# Patient Record
Sex: FEMALE | Race: WHITE | HISPANIC OR LATINO | Employment: OTHER | URBAN - METROPOLITAN AREA
[De-identification: names, ages, dates, MRNs, and addresses within clinical notes are randomized per-mention and may not be internally consistent; named-entity substitution may affect disease eponyms.]

---

## 2017-11-28 ENCOUNTER — OFFICE VISIT (OUTPATIENT)
Dept: BARIATRICS/WEIGHT MGMT | Facility: CLINIC | Age: 57
End: 2017-11-28

## 2017-11-28 VITALS
SYSTOLIC BLOOD PRESSURE: 172 MMHG | WEIGHT: 222 LBS | RESPIRATION RATE: 16 BRPM | BODY MASS INDEX: 39.34 KG/M2 | HEIGHT: 63 IN | HEART RATE: 71 BPM | DIASTOLIC BLOOD PRESSURE: 85 MMHG | TEMPERATURE: 98.2 F

## 2017-11-28 DIAGNOSIS — R63.2 POLYPHAGIA: ICD-10-CM

## 2017-11-28 DIAGNOSIS — I10 ESSENTIAL HYPERTENSION: ICD-10-CM

## 2017-11-28 DIAGNOSIS — E66.9 OBESITY, CLASS II, BMI 35-39.9: Primary | ICD-10-CM

## 2017-11-28 DIAGNOSIS — E11.69 DIABETES MELLITUS TYPE 2 IN OBESE (HCC): ICD-10-CM

## 2017-11-28 DIAGNOSIS — E66.9 DIABETES MELLITUS TYPE 2 IN OBESE (HCC): ICD-10-CM

## 2017-11-28 PROBLEM — Z46.51 FITTING AND ADJUSTMENT OF GASTRIC LAP BAND: Status: ACTIVE | Noted: 2017-11-28

## 2017-11-28 PROCEDURE — S2083 ADJUSTMENT GASTRIC BAND: HCPCS | Performed by: NURSE PRACTITIONER

## 2017-11-28 RX ORDER — PIOGLITAZONEHYDROCHLORIDE 30 MG/1
1 TABLET ORAL DAILY
COMMUNITY
Start: 2017-11-24

## 2017-11-28 RX ORDER — AMLODIPINE BESYLATE 5 MG/1
TABLET ORAL
COMMUNITY
Start: 2017-11-24

## 2017-11-28 RX ORDER — NATEGLINIDE 60 MG/1
1 TABLET ORAL 2 TIMES DAILY
COMMUNITY
Start: 2017-11-24

## 2017-11-28 RX ORDER — OXYBUTYNIN CHLORIDE 5 MG/1
1 TABLET ORAL DAILY
COMMUNITY
Start: 2017-11-24

## 2017-11-28 RX ORDER — LOSARTAN POTASSIUM 50 MG/1
TABLET ORAL
COMMUNITY
Start: 2017-09-29 | End: 2021-12-07

## 2017-11-28 NOTE — PROGRESS NOTES
[facility] BARIATRIC [facility] BARIATRIC SURGERY  [address]  [facility] [address] [phone]  [address]  [facility] [address] [phone]  Dept: [phone]  11/28/2017      Meera Ewing.  [id number]  [id number]  1960  female      Chief Complaint   Patient presents with   • Follow-up     Followup AGB       BH Post-Op Bariatric Surgery:   Meera Ewing is status post Lapband procedure (APL), performed in 2014 in [place].     HPI:   Today's weight is 222 lb (101 kg)  pounds, today's BMI is Body mass index is 39.33 kg/(m^2).  The patient reports a portion size larger than the small plate, increased hunger and denies a loss of appetite.     Meera Ewing denies nausea, dysphagia, or abdominal pain. The patientdoes not have vomitng. The patientdoes not have reflux.    Diet and Exercise: Diet history reviewed and discussed with the patient. Weight loss/gains to date discussed with the patient. She reports eating 3 meals per day, a typical portion size of greater than 1 cup, eating 1 snack per day, drinking 2-3, 8-oz. glasses of water per day. The patient can tolerate solid protein.   The patient is eating protein first. The patient is limiting food volume. The patient is not taking vitamins. The patient is limiting snacking. The patient is exercising regularly. She is not drinking carbonated beverages.     The following portions of the patient's history were reviewed and updated as appropriate: allergies, current medications, past family history, past medical history, past social history, past surgical history and problem list.    Vitals:    11/28/17 0910   BP: 172/85   Pulse: 71   Resp: 16   Temp: 98.2 °F (36.8 °C)       Review of Systems   Constitutional: Positive for appetite change (polypaghia). Negative for fatigue and unexpected weight change.   HENT: Negative.    Eyes: Negative.    Respiratory: Negative.    Cardiovascular: Negative.  Negative  for leg swelling.   Gastrointestinal: Negative for abdominal distention, abdominal pain, constipation, diarrhea, nausea and vomiting.   Genitourinary: Negative for difficulty urinating, frequency and urgency.   Musculoskeletal: Negative for back pain.   Skin: Negative.    Psychiatric/Behavioral: Negative.    All other systems reviewed and are negative.      Physical Exam   Constitutional: She appears well-developed and well-nourished.   Neck: No thyromegaly present.   Cardiovascular: Normal rate, regular rhythm and normal heart sounds.    Pulmonary/Chest: Effort normal and breath sounds normal. No respiratory distress. She has no wheezes.   Abdominal: Soft. Bowel sounds are normal. She exhibits no distension. There is no tenderness. There is no guarding. No hernia.   Musculoskeletal: She exhibits no edema or tenderness.   Neurological: She is alert.   Skin: Skin is warm and dry. No rash noted. No erythema.   Psychiatric: She has a normal mood and affect. Her behavior is normal.   Nursing note and vitals reviewed.        Assessment: Post-operatively the patient is doing well    Encounter Diagnosis   Name Primary?   • Obesity, Class II, BMI 35-39.9 Yes       Plan:     I think she would benefit from additional band restriction.  Under aseptic conditions, I accessed the port and 0.4mls of fluid were added for a total of 8.6mls.  She was able to tolerate a glass of water at the conclusion of the fill.  She was advised to consume soft solids for 24 hours before advancing back to a regular diet.  RTC for n/v/d/regurg.     We discussed her protein sources and that eating dense solid protein along with plenty of vegetables and fresh fruits is important for weight loss. Reviewed appropriate water intake- half of body weight in ounces and exercise routine- minimum of 150 minutes per with including both cardio and strength training. Instructed not to drink with meals and wait 45 minutes after each meal before drinking.    She  should follow-up in 4-6 weeks       Activity restrictions: None.   Instructions / Recommendations: dietary counseling recommended, recommended a daily protein intake of  grams, patient was advised that the lap band system works best when consuming solid foods, vitamin supplement(s) recommended, recommended exercising at least 150 minutes per week, behavior modifications recommended and instructed to call the office for concerns, questions, or problems.

## 2017-12-28 ENCOUNTER — OFFICE VISIT (OUTPATIENT)
Dept: BARIATRICS/WEIGHT MGMT | Facility: CLINIC | Age: 57
End: 2017-12-28

## 2017-12-28 VITALS
HEART RATE: 57 BPM | HEIGHT: 63 IN | TEMPERATURE: 98.6 F | BODY MASS INDEX: 38.27 KG/M2 | DIASTOLIC BLOOD PRESSURE: 80 MMHG | RESPIRATION RATE: 16 BRPM | SYSTOLIC BLOOD PRESSURE: 167 MMHG | WEIGHT: 216 LBS

## 2017-12-28 DIAGNOSIS — E66.9 OBESITY, CLASS II, BMI 35-39.9: Primary | ICD-10-CM

## 2017-12-28 DIAGNOSIS — E66.9 DIABETES MELLITUS TYPE 2 IN OBESE (HCC): ICD-10-CM

## 2017-12-28 DIAGNOSIS — I10 ESSENTIAL HYPERTENSION: ICD-10-CM

## 2017-12-28 DIAGNOSIS — R63.2 POLYPHAGIA: ICD-10-CM

## 2017-12-28 DIAGNOSIS — E11.69 DIABETES MELLITUS TYPE 2 IN OBESE (HCC): ICD-10-CM

## 2017-12-28 PROCEDURE — 99213 OFFICE O/P EST LOW 20 MIN: CPT | Performed by: NURSE PRACTITIONER

## 2017-12-28 NOTE — PROGRESS NOTES
[facility] BARIATRIC [facility] BARIATRIC SURGERY  [address]  [facility] [id number] [phone]  [address]  [facility] [address] [phone]  Dept: [phone]  12/28/2017      Meera Ewing.  [id number]  [id number]  1960  female      Chief Complaint   Patient presents with   • Follow-up     Followup AGB (8.6 mL)       BH Post-Op Bariatric Surgery:   Meera Ewing is status post Lapband procedure (APL), performed in 2014     HPI:   Today's weight is 98 kg (216 lb) pounds, today's BMI is Body mass index is 38.27 kg/(m^2). and she has a loss of 6 pounds since the last visit. The patient reports experiencing hunger, but decreased hunger and loss of appetite.     Meera Ewing denies nausea, dysphagia or abdominal pain. The patientdoes not have vomiting or regurgitation. The patientdoes not have heartburn/reflux.    Patient states their portion size is the small plate and their hunger is appropriate.    Diet and Exercise: Diet history reviewed and discussed with the patient. Weight loss/gains to date discussed with the patient. She reports eating 3 meals per day, a typical portion size of 3 cup, eating 1 snack per day, drinking 3-4 8-oz. glasses of water per day. The patient can tolerate solid protein.   The patient is eating protein first. The patient is limiting food volume. The patient is taking vitamins. The patient is limiting snacking. The patient is regular exercise- walking 3-4 days per week. She is drinking carbonated beverages.     The following portions of the patient's history were reviewed and updated as appropriate: allergies, current medications, past family history, past medical history, past social history, past surgical history and problem list.    Review of Systems   Constitutional: Positive for appetite change. Negative for fatigue and unexpected weight change.   HENT: Negative.    Eyes: Negative.    Respiratory: Negative.     Cardiovascular: Negative.  Negative for leg swelling.   Gastrointestinal: Negative for abdominal distention, abdominal pain, constipation, diarrhea, nausea and vomiting.   Genitourinary: Negative for difficulty urinating, frequency and urgency.   Musculoskeletal: Negative for back pain.   Skin: Negative.    Psychiatric/Behavioral: Negative.    All other systems reviewed and are negative.      Vitals:    12/28/17 0839   BP: 167/80   Pulse: 57   Resp: 16   Temp: 98.6 °F (37 °C)       Physical Exam   Constitutional: She appears well-developed and well-nourished.   Neck: No thyromegaly present.   Cardiovascular: Normal rate, regular rhythm and normal heart sounds.    Pulmonary/Chest: Effort normal and breath sounds normal. No respiratory distress. She has no wheezes.   Abdominal: Soft. Bowel sounds are normal. She exhibits no distension. There is no tenderness. There is no guarding. No hernia.   Musculoskeletal: She exhibits no edema or tenderness.   Neurological: She is alert.   Skin: Skin is warm and dry. No rash noted. No erythema.   Psychiatric: She has a normal mood and affect. Her behavior is normal.   Nursing note and vitals reviewed.      Assessment: Post-operatively the patient is doing well.     Encounter Diagnoses   Name Primary?   • Obesity, Class II, BMI 35-39.9 Yes   • Essential hypertension    • Diabetes mellitus type 2 in obese    • Polyphagia        Plan:    No adjustment today as patient has ideal level of restriction. RTC for n/v/d/regurg. Encouraged patient to be sure to eat plenty of dense lean protein and high fiber foods like vegetables and fresh fruits while reducing simple carbohydrates. Reviewed the importance of eating solid foods vs. soft which will contribute to weight gain. Discussed the recommended amount of water to intake daily- half of body weight in ounces. Not drinking with or right after meals.    Activity restrictions: None.   Instructions / Recommendations: dietary counseling  recommended, recommended a daily protein intake of  grams, patient was advised that the lap band system works best when consuming solid foods, vitamin supplement(s) recommended, recommended exercising at least 150 minutes per week inclduing both cardio and strength training, behavior modifications recommended and instructed to call the office for concerns, questions, or problems.     The patient was instructed to follow up in 4-6 weeks      The patient was counseled regarding exercise, fluid intake, protein intake, lab work. Total time spent face to face was 20 minutes and 15 minutes was spent counseling.

## 2021-12-07 ENCOUNTER — OFFICE VISIT (OUTPATIENT)
Dept: BARIATRICS/WEIGHT MGMT | Facility: CLINIC | Age: 61
End: 2021-12-07

## 2021-12-07 VITALS
WEIGHT: 235 LBS | RESPIRATION RATE: 18 BRPM | SYSTOLIC BLOOD PRESSURE: 136 MMHG | HEIGHT: 63 IN | BODY MASS INDEX: 41.64 KG/M2 | DIASTOLIC BLOOD PRESSURE: 76 MMHG | TEMPERATURE: 97.5 F | HEART RATE: 72 BPM

## 2021-12-07 DIAGNOSIS — I10 ESSENTIAL HYPERTENSION: ICD-10-CM

## 2021-12-07 DIAGNOSIS — E66.01 OBESITY, CLASS III, BMI 40-49.9 (MORBID OBESITY) (HCC): Primary | ICD-10-CM

## 2021-12-07 DIAGNOSIS — E66.9 DIABETES MELLITUS TYPE 2 IN OBESE (HCC): ICD-10-CM

## 2021-12-07 DIAGNOSIS — E11.69 DIABETES MELLITUS TYPE 2 IN OBESE (HCC): ICD-10-CM

## 2021-12-07 PROCEDURE — 99203 OFFICE O/P NEW LOW 30 MIN: CPT | Performed by: NURSE PRACTITIONER

## 2021-12-07 RX ORDER — FERROUS SULFATE 325(65) MG
325 TABLET ORAL DAILY
COMMUNITY

## 2021-12-07 RX ORDER — CLOTRIMAZOLE AND BETAMETHASONE DIPROPIONATE 10; .64 MG/G; MG/G
1 CREAM TOPICAL 2 TIMES DAILY PRN
COMMUNITY

## 2021-12-07 RX ORDER — UREA 10 %
800 LOTION (ML) TOPICAL DAILY
COMMUNITY

## 2021-12-07 RX ORDER — ERGOCALCIFEROL 1.25 MG/1
50000 CAPSULE ORAL WEEKLY
COMMUNITY

## 2021-12-07 RX ORDER — ATORVASTATIN CALCIUM 10 MG/1
1 TABLET, FILM COATED ORAL DAILY
COMMUNITY
Start: 2021-07-23

## 2021-12-07 RX ORDER — AMMONIUM LACTATE 12 G/100G
1 LOTION TOPICAL SEE ADMIN INSTRUCTIONS
COMMUNITY
Start: 2021-07-23 | End: 2021-12-07

## 2021-12-07 RX ORDER — LOSARTAN POTASSIUM 100 MG/1
100 TABLET ORAL DAILY
COMMUNITY
Start: 2021-10-20 | End: 2022-10-21

## 2021-12-07 RX ORDER — BACITRACIN ZINC 500 [USP'U]/G
1 OINTMENT TOPICAL 2 TIMES DAILY
COMMUNITY
Start: 2021-06-29 | End: 2021-12-07

## 2021-12-07 RX ORDER — BIOTIN 10 MG
1 TABLET ORAL DAILY
COMMUNITY

## 2021-12-07 RX ORDER — TOPIRAMATE 50 MG/1
TABLET, FILM COATED ORAL
Qty: 194 TABLET | Refills: 0 | Status: SHIPPED | OUTPATIENT
Start: 2021-12-07 | End: 2022-02-15

## 2021-12-07 RX ORDER — MELATONIN
5000 DAILY
COMMUNITY
End: 2021-12-07

## 2021-12-07 RX ORDER — ZOLPIDEM TARTRATE 10 MG/1
10 TABLET ORAL NIGHTLY
COMMUNITY
Start: 2021-11-12 | End: 2022-05-12

## 2021-12-07 NOTE — PROGRESS NOTES
[facility] BARIATRIC [facility] BARIATRIC SURGERY  [address] [facility] [address]  [facility] [address]  [phone]  [address] [address]  [phone]  Dept: [phone]  12/7/2021      Meera Ewing.  [id number]  [id number]  1960  female      Chief Complaint   Patient presents with   • Follow-up     BAND FOLLOW UP       BH Post-Op Bariatric Surgery:   Meera Ewing is status post Lapband procedure APL, performed in 2014 with 8.6mls who presents seeking adjunct therapy to aid in weight loss.    HPI:   Today's weight is 107 kg (235 lb) pounds, today's BMI is Body mass index is 41.64 kg/m². and@ has a gain of 19 pounds since the last visit. The patient reports experiencing hunger, but decreased hunger and loss of appetite.     Meera Ewing denies abdominal pain. The patientdoes not have vomiting or regurgitation. The patientdoes not have heartburn/reflux.    Patient states their portion size is the small plate and their hunger is appropriate.    Diet and Exercise: Diet history reviewed and discussed with the patient. Weight loss/gains to date discussed with the patient. She reports eating 3 meals per day, a typical portion size of 1/2 cup, eating 1 snack per day, drinking 5-6 8-oz. glasses of water per day. The patient can tolerate solid protein.   The patient is eating protein first. The patient is limiting food volume. The patient is taking vitamins. The patient is not limiting snacking. The patient is regular exercise. She is not drinking carbonated beverages.     The following portions of the patient's history were reviewed and updated as appropriate: allergies, current medications, past family history, past medical history, past social history, past surgical history and problem list.    Review of Systems   Constitutional: Positive for appetite change. Negative for fatigue and unexpected weight change.   HENT: Negative.    Eyes: Negative.    Respiratory:  Negative.    Cardiovascular: Negative.  Negative for leg swelling.   Gastrointestinal: Negative for abdominal distention, abdominal pain, constipation, diarrhea, nausea and vomiting.   Genitourinary: Negative for difficulty urinating, frequency and urgency.   Musculoskeletal: Negative for back pain.   Skin: Negative.    Psychiatric/Behavioral: Negative.    All other systems reviewed and are negative.      Vitals:    12/07/21 0824   BP: 136/76   Pulse: 72   Resp: 18   Temp: 97.5 °F (36.4 °C)       Physical Exam  Vitals and nursing note reviewed.   Constitutional:       Appearance: She is well-developed.   Neck:      Thyroid: No thyromegaly.   Cardiovascular:      Rate and Rhythm: Normal rate.   Pulmonary:      Effort: Pulmonary effort is normal. No respiratory distress.   Abdominal:      Palpations: Abdomen is soft.   Musculoskeletal:         General: No tenderness.   Skin:     General: Skin is warm and dry.   Neurological:      Mental Status: She is alert.   Psychiatric:         Behavior: Behavior normal.         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     Encounter Diagnoses   Name Primary?   • Obesity, Class III, BMI 40-49.9 (morbid obesity) ([facility]) Yes   • Diabetes mellitus type 2 in obese ([facility])    • Essential hypertension        Plan:  We discussed taking low dose topirimate in the evenings starting at 25mg and working up to 50mg after two weeks. She does report a distant history of kidney stones but gets plenty of fluid in daily. She is interested in phentermine, but we will need to update an EKG first and we will see how she does with this first.     No adjustment today as patient has ideal level of restriction. RTC for n/v/d/regurg. Encouraged patient to be sure to eat plenty of dense lean protein and high fiber foods like vegetables and fresh fruits while reducing simple  carbohydrates. Reviewed the importance of eating solid foods vs. soft which will contribute to weight gain. Discussed the recommended amount of water to intake daily- half of body weight in ounces. Not drinking with or right after meals.    Activity restrictions: None.   Instructions / Recommendations: dietary counseling recommended, recommended a daily protein intake of  grams, patient was advised that the lap band system works best when consuming solid foods, vitamin supplement(s) recommended, recommended exercising at least 150 minutes per week inclduing both cardio and strength training, behavior modifications recommended and instructed to call the office for concerns, questions, or problems.     The patient was instructed to follow up in 3 onths      The patient was counseled regarding the LAP-BAND and how the band works.  Total time spent face to face was 30 minutes.  The chart was reviewed prior to starting the visit.

## 2022-02-15 ENCOUNTER — OFFICE VISIT (OUTPATIENT)
Dept: BARIATRICS/WEIGHT MGMT | Facility: CLINIC | Age: 62
End: 2022-02-15

## 2022-02-15 VITALS
WEIGHT: 226 LBS | DIASTOLIC BLOOD PRESSURE: 73 MMHG | BODY MASS INDEX: 40.04 KG/M2 | TEMPERATURE: 97.5 F | RESPIRATION RATE: 18 BRPM | HEIGHT: 63 IN | HEART RATE: 75 BPM | SYSTOLIC BLOOD PRESSURE: 132 MMHG

## 2022-02-15 DIAGNOSIS — Z46.51 FITTING AND ADJUSTMENT OF GASTRIC LAP BAND: ICD-10-CM

## 2022-02-15 DIAGNOSIS — E11.69 DIABETES MELLITUS TYPE 2 IN OBESE: ICD-10-CM

## 2022-02-15 DIAGNOSIS — E66.01 OBESITY, CLASS III, BMI 40-49.9 (MORBID OBESITY): Primary | ICD-10-CM

## 2022-02-15 DIAGNOSIS — E66.9 DIABETES MELLITUS TYPE 2 IN OBESE: ICD-10-CM

## 2022-02-15 DIAGNOSIS — I10 ESSENTIAL HYPERTENSION: ICD-10-CM

## 2022-02-15 PROCEDURE — 99213 OFFICE O/P EST LOW 20 MIN: CPT | Performed by: NURSE PRACTITIONER

## 2022-02-15 RX ORDER — DIAZEPAM 10 MG/1
10 TABLET ORAL AS NEEDED
COMMUNITY
Start: 2022-01-20 | End: 2022-05-17

## 2022-02-15 RX ORDER — METHYLPREDNISOLONE 4 MG/1
1 TABLET ORAL DAILY
COMMUNITY
Start: 2022-02-08 | End: 2022-05-17

## 2022-02-15 RX ORDER — VENLAFAXINE HYDROCHLORIDE 37.5 MG/1
37.5 CAPSULE, EXTENDED RELEASE ORAL DAILY
COMMUNITY
Start: 2022-02-14 | End: 2023-02-15

## 2022-02-15 RX ORDER — TOPIRAMATE 50 MG/1
50 TABLET, FILM COATED ORAL NIGHTLY
Qty: 90 TABLET | Refills: 0 | Status: SHIPPED | OUTPATIENT
Start: 2022-02-15 | End: 2022-05-17 | Stop reason: SDUPTHER

## 2022-02-15 RX ORDER — PHENTERMINE HYDROCHLORIDE 15 MG/1
15 CAPSULE ORAL EVERY MORNING
Qty: 90 CAPSULE | Refills: 0 | Status: SHIPPED | OUTPATIENT
Start: 2022-02-15 | End: 2022-05-17 | Stop reason: SDUPTHER

## 2022-02-15 NOTE — PROGRESS NOTES
[facility] BARIATRIC [facility] BARIATRIC SURGERY  [address] [facility] [address]  [facility] [address]  [phone]  [address] [facility] [address]  [facility] [address]  [phone]  Dept: [phone]  2/15/2022      Meera Ewing.  [id number]  [id number]  1960  female      Chief Complaint   Patient presents with   • Follow-up     band follow up  (8.6ml)       BH Post-Op Bariatric Surgery:   Meera Ewing is status post Laparoscopic Band (APL) procedure, performed in 2014 who has been taking nightly topirimate at the 50mg dose for the last two months to help with nighttime cravings.    HPI:   Today's weight is 103 kg (226 lb) pounds, today's BMI is Body mass index is 40.04 kg/m².,@ has a  loss of 9 pounds . The patient reports a decreased portion size and loss of appetite.      Meera Ewing denies nausea, vomiting, reflux, flank pain, changes in urinary output and reports that she is tolerating the medication well. She would like to add phentermine as an adjunct. She did have her EKG updated last month.      Diet and Exercise: Diet history reviewed and discussed with the patient. Weight loss/gains to date discussed with the patient. The patient states they are eating 60 grams of protein per day. She reports eating 3 meals per day, a typical portion size of 1/2 cup, eating 0-1 snacks per day, drinking 5-6 or more 8-oz. 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     Review of Systems   Constitutional: Positive for appetite change. Negative for fatigue and unexpected weight change.   HENT: Negative.    Eyes: Negative.    Respiratory: Negative.    Cardiovascular: Negative.  Negative for leg swelling.   Gastrointestinal: Negative for abdominal distention, abdominal pain, constipation, diarrhea, nausea and vomiting.   Genitourinary: Negative for difficulty urinating, frequency and urgency.   Musculoskeletal:  Negative for back pain.   Skin: Negative.    Psychiatric/Behavioral: Negative.    All other systems reviewed and are negative.      Patient Active Problem List   Diagnosis   • Fitting and adjustment of gastric lap band   • Polyphagia   • Diabetes mellitus type 2 in obese (HCC)   • Essential hypertension   • Anxiety   • Obesity, Class III, BMI 40-49.9 (morbid obesity) ([facility])       Past Medical History:   Diagnosis Date   • Diabetes (HCC)    • HTN (hypertension)        The following portions of the patient's history were reviewed and updated as appropriate: allergies, current medications, past family history, past medical history, past social history, past surgical history and problem list.    Vitals:    02/15/22 0903   BP: 132/73   Pulse: 75   Resp: 18   Temp: 97.5 °F (36.4 °C)       Physical Exam  Vitals and nursing note reviewed.   Constitutional:       Appearance: She is well-developed.   Neck:      Thyroid: No thyromegaly.   Cardiovascular:      Rate and Rhythm: Normal rate.   Pulmonary:      Effort: Pulmonary effort is normal. No respiratory distress.   Abdominal:      Palpations: Abdomen is soft.   Musculoskeletal:         General: No tenderness.   Skin:     General: Skin is warm and dry.   Neurological:      Mental Status: She is alert.   Psychiatric:         Behavior: Behavior normal.         Assessment:   Post-op, the patient is doing well.     Encounter Diagnoses   Name Primary?   • Obesity, Class III, BMI 40-49.9 (morbid obesity) ([facility]) Yes   • Essential hypertension    • Diabetes mellitus type 2 in obese (HCC)    • Fitting and adjustment of gastric lap band        Plan:   Will trend CMP to evaluate renal function to continue topirimate. She will start phentermine 15mg daily for the next three month.  Encouraged patient to be sure to get plenty of lean protein per day through small frequent meals all with a protein source.   Activity restrictions: none.   Recommended patient be sure to get at least 70 grams  of protein per day by eating small, frequent meals all with high lean protein choices. Be sure to limit/cut back on daily carbohydrate intake. Discussed with the patient the recommended amount of water per day to intake- half of body weight in ounces. Reviewed vitamin requirements. Be sure to do routine exercise, 150 minutes per week minimum, including both cardio and strength training.     Instructions / Recommendations: dietary counseling recommended, recommended a daily protein intake of  grams, vitamin supplement(s) recommended, recommended exercising at least 150 minutes per week, behavior modifications recommended and instructed to call the office for concerns, questions, or problems.     The patient was instructed to follow up in 3 months   Total time spent during this encounter today was 25 minutes

## 2022-02-28 ENCOUNTER — TELEPHONE (OUTPATIENT)
Dept: BARIATRICS/WEIGHT MGMT | Facility: CLINIC | Age: 62
End: 2022-02-28

## 2022-02-28 NOTE — TELEPHONE ENCOUNTER
inform about results labs  ----- Message from VEENA Craig sent at 2/23/2022  4:45 PM EST -----  Normal CMP

## 2022-03-24 ENCOUNTER — LAB (OUTPATIENT)
Dept: LAB | Facility: HOSPITAL | Age: 62
End: 2022-03-24

## 2022-03-24 ENCOUNTER — OFFICE VISIT (OUTPATIENT)
Dept: NEUROLOGY | Facility: CLINIC | Age: 62
End: 2022-03-24

## 2022-03-24 VITALS
BODY MASS INDEX: 41.11 KG/M2 | HEIGHT: 63 IN | WEIGHT: 232 LBS | DIASTOLIC BLOOD PRESSURE: 59 MMHG | SYSTOLIC BLOOD PRESSURE: 131 MMHG | HEART RATE: 59 BPM

## 2022-03-24 DIAGNOSIS — E66.01 OBESITY, CLASS III, BMI 40-49.9 (MORBID OBESITY): ICD-10-CM

## 2022-03-24 DIAGNOSIS — E11.65 TYPE 2 DIABETES MELLITUS WITH HYPERGLYCEMIA, WITHOUT LONG-TERM CURRENT USE OF INSULIN: ICD-10-CM

## 2022-03-24 DIAGNOSIS — G44.86 CERVICOGENIC HEADACHE: ICD-10-CM

## 2022-03-24 DIAGNOSIS — I10 ESSENTIAL HYPERTENSION: ICD-10-CM

## 2022-03-24 DIAGNOSIS — Z91.89 STROKE RISK: ICD-10-CM

## 2022-03-24 DIAGNOSIS — R20.2 PARESTHESIAS: Primary | ICD-10-CM

## 2022-03-24 DIAGNOSIS — Z46.51 FITTING AND ADJUSTMENT OF GASTRIC LAP BAND: ICD-10-CM

## 2022-03-24 DIAGNOSIS — E66.9 DIABETES MELLITUS TYPE 2 IN OBESE: ICD-10-CM

## 2022-03-24 DIAGNOSIS — E11.649 TYPE 2 DIABETES MELLITUS WITH HYPOGLYCEMIA WITHOUT COMA, UNSPECIFIED WHETHER LONG TERM INSULIN USE: ICD-10-CM

## 2022-03-24 DIAGNOSIS — E11.69 DIABETES MELLITUS TYPE 2 IN OBESE: ICD-10-CM

## 2022-03-24 LAB
ALBUMIN SERPL-MCNC: 4 G/DL (ref 3.5–5.2)
ALBUMIN/GLOB SERPL: 1.5 G/DL
ALP SERPL-CCNC: 69 U/L (ref 39–117)
ALT SERPL W P-5'-P-CCNC: 15 U/L (ref 1–33)
ANION GAP SERPL CALCULATED.3IONS-SCNC: 7.9 MMOL/L (ref 5–15)
AST SERPL-CCNC: 17 U/L (ref 1–32)
BILIRUB SERPL-MCNC: 0.7 MG/DL (ref 0–1.2)
BUN SERPL-MCNC: 11 MG/DL (ref 8–23)
BUN/CREAT SERPL: 15.3 (ref 7–25)
CALCIUM SPEC-SCNC: 9.7 MG/DL (ref 8.6–10.5)
CHLORIDE SERPL-SCNC: 104 MMOL/L (ref 98–107)
CHOLEST SERPL-MCNC: 180 MG/DL (ref 0–200)
CO2 SERPL-SCNC: 29.1 MMOL/L (ref 22–29)
CREAT SERPL-MCNC: 0.72 MG/DL (ref 0.57–1)
EGFRCR SERPLBLD CKD-EPI 2021: 94.7 ML/MIN/1.73
ERYTHROCYTE [SEDIMENTATION RATE] IN BLOOD: 11 MM/HR (ref 0–30)
GLOBULIN UR ELPH-MCNC: 2.7 GM/DL
GLUCOSE SERPL-MCNC: 87 MG/DL (ref 65–99)
HBA1C MFR BLD: 6.1 % (ref 4.8–5.6)
HDLC SERPL-MCNC: 72 MG/DL (ref 40–60)
LDLC SERPL CALC-MCNC: 96 MG/DL (ref 0–100)
LDLC/HDLC SERPL: 1.33 {RATIO}
POTASSIUM SERPL-SCNC: 4.3 MMOL/L (ref 3.5–5.2)
PROT SERPL-MCNC: 6.7 G/DL (ref 6–8.5)
SODIUM SERPL-SCNC: 141 MMOL/L (ref 136–145)
TRIGL SERPL-MCNC: 61 MG/DL (ref 0–150)
VLDLC SERPL-MCNC: 12 MG/DL (ref 5–40)

## 2022-03-24 PROCEDURE — 99204 OFFICE O/P NEW MOD 45 MIN: CPT | Performed by: PSYCHIATRY & NEUROLOGY

## 2022-03-24 PROCEDURE — 36415 COLL VENOUS BLD VENIPUNCTURE: CPT

## 2022-03-24 PROCEDURE — 80053 COMPREHEN METABOLIC PANEL: CPT

## 2022-03-24 PROCEDURE — 85652 RBC SED RATE AUTOMATED: CPT

## 2022-03-24 PROCEDURE — 83036 HEMOGLOBIN GLYCOSYLATED A1C: CPT

## 2022-03-24 PROCEDURE — 80061 LIPID PANEL: CPT

## 2022-03-24 NOTE — ASSESSMENT & PLAN NOTE
I discussed with her that the paresthesias are in itself not neurologically diagnostic.  Discussed with her that the MRI of the brain shows small vessel disease and no evidence of stroke, tumor.

## 2022-03-24 NOTE — PROGRESS NOTES
"Chief Complaint  Abnormal Imaging (02/16/22 [place])    Subjective          Meera Ewing is a 61 y.o. female who presents to [facility] NEUROLOGY & NEUROSURGERY  History of Present Illness  61-year-old woman evaluated for paresthesias.  She states that she has had paresthesias in different parts of her body for the last 2 months.  It could be her leg, feet, hands, arm, that gets numbness and tingling which comes and goes for 2 minutes at a time.  She states that it is also in her neck, shoulders.  And one of her face.  This is when she got concerned.  She states that her left face got numb but not associated with any other parts of her body.  She states that it comes and goes several times a day for several minutes at a time.  It is usually worse in the evening.  There is no other associated symptoms with it.  There is no slurring of speech, facial weakness, visual problems.    She has a long history of diabetes, hypertension and her BMI is 41.  She has never had any history of stroke in the past.  She had an MRI of the brain which shows mild nonspecific white matter disease however the radiologist called it possibly secondary to press which is posterior reversible encephalopathy syndrome.    She is also complaining of a dull occipital headache that is there usually worse in the evenings.  It is not sharp and stabbing.  There is no associated jaw claudication, visual complaints.  She does not have any significant neck pain or neck stiffness.    Objective   Vital Signs:   /59   Pulse 59   Ht 160 cm (62.99\")   Wt 105 kg (232 lb)   BMI 41.11 kg/m²     Physical Exam   She is alert, fluent, phasic, follows commands well.  Optic disc are normal bilaterally visual fields full to confrontation, EOMs are full all directions gaze, facial strength is full, soft palate elevation and tongue are normal.  There is no weakness of the upper or lower extremities and vision muscle testing.  Reflexes " are absent in the biceps, triceps, patellar's and ankles.  Sensation symmetrical pinprick in her face arm.  Cerebellar testing is intact.  Station gait she is able to tiptoe, heel walk, ibrahima.  Armswing is normal.  Turning is intact.  Heart is regular rhythm normal in rate.  Carotids are clear bilaterally.        Assessment and Plan  Diagnoses and all orders for this visit:    1. Paresthesias (Primary)  Assessment & Plan:  I discussed with her that the paresthesias are in itself not neurologically diagnostic.  Discussed with her that the MRI of the brain shows small vessel disease and no evidence of stroke, tumor.    Orders:  -     Ambulatory Referral to Neurology  -     US Color Flow Doppler Carotid Bilateral; Future    2. Type 2 diabetes mellitus with hyperglycemia, without long-term current use of insulin (HCC)  -     Sedimentation Rate; Future  -     Lipid Panel; Future  -     Hemoglobin A1c; Future  -     Ambulatory Referral to Neurology  -     US Color Flow Doppler Carotid Bilateral; Future    3. Cervicogenic headache  Assessment & Plan:  I will refer her for right great occipital nerve blocks.    Orders:  -     Ambulatory Referral to Neurology  -     US Color Flow Doppler Carotid Bilateral; Future    4. Stroke risk  Assessment & Plan:  I will obtain a carotid ultrasound.  She is to start taking low-dose aspirin on a daily basis.  I discussed with her regarding risk factors to lower her risk for stroke including controlling her hemoglobin A1c to below 7, treating her hypercholesterolemia and hypertension, losing weight to a more normal BMI, exercise.  She is to follow-up with her primary care provider.    Orders:  -     Ambulatory Referral to Neurology  -     US Color Flow Doppler Carotid Bilateral; Future       Total time spent with the patient and coordinating patient care was 50 minutes.    Follow Up  No follow-ups on file.  Patient was given instructions and counseling regarding her condition or for health  maintenance advice. Please see specific information pulled into the AVS if appropriate.

## 2022-03-24 NOTE — ASSESSMENT & PLAN NOTE
I will obtain a carotid ultrasound.  She is to start taking low-dose aspirin on a daily basis.  I discussed with her regarding risk factors to lower her risk for stroke including controlling her hemoglobin A1c to below 7, treating her hypercholesterolemia and hypertension, losing weight to a more normal BMI, exercise.  She is to follow-up with her primary care provider.

## 2022-03-25 ENCOUNTER — TELEPHONE (OUTPATIENT)
Dept: NEUROLOGY | Facility: CLINIC | Age: 62
End: 2022-03-25

## 2022-03-25 NOTE — TELEPHONE ENCOUNTER
ALEXANDREA HENDRICKSON (PATIENT)    [phone]    CALLED FOR LAB RESULTS DONE YESTERDAY. I LET HER KNOW SHE'D EITHER GET CALL BACK TODAY OR Monday    PLEASE ADVISE

## 2022-04-05 ENCOUNTER — TELEPHONE (OUTPATIENT)
Dept: NEUROLOGY | Facility: CLINIC | Age: 62
End: 2022-04-05

## 2022-04-06 DIAGNOSIS — I67.9 SMALL VESSEL DISEASE, CEREBROVASCULAR: Primary | ICD-10-CM

## 2022-04-21 ENCOUNTER — HOSPITAL ENCOUNTER (OUTPATIENT)
Dept: CARDIOLOGY | Facility: HOSPITAL | Age: 62
Discharge: HOME | End: 2022-04-21
Admitting: PSYCHIATRY & NEUROLOGY

## 2022-04-21 DIAGNOSIS — I67.9 SMALL VESSEL DISEASE, CEREBROVASCULAR: ICD-10-CM

## 2022-04-21 LAB
BH CV XLRA MEAS LEFT CAROTID BULB EDV: 26 CM/SEC
BH CV XLRA MEAS LEFT CAROTID BULB PSV: 65 CM/SEC
BH CV XLRA MEAS LEFT DIST CCA EDV: 22 CM/SEC
BH CV XLRA MEAS LEFT DIST CCA PSV: 65 CM/SEC
BH CV XLRA MEAS LEFT DIST ICA EDV: 39 CM/SEC
BH CV XLRA MEAS LEFT DIST ICA PSV: 103 CM/SEC
BH CV XLRA MEAS LEFT MID ICA EDV: 31 CM/SEC
BH CV XLRA MEAS LEFT MID ICA PSV: 90 CM/SEC
BH CV XLRA MEAS LEFT PROX CCA EDV: 26 CM/SEC
BH CV XLRA MEAS LEFT PROX CCA PSV: 97 CM/SEC
BH CV XLRA MEAS LEFT PROX ECA EDV: 24 CM/SEC
BH CV XLRA MEAS LEFT PROX ECA PSV: 96 CM/SEC
BH CV XLRA MEAS LEFT PROX ICA EDV: 28 CM/SEC
BH CV XLRA MEAS LEFT PROX ICA PSV: 85 CM/SEC
BH CV XLRA MEAS LEFT VERTEBRAL A EDV: 20 CM/SEC
BH CV XLRA MEAS LEFT VERTEBRAL A PSV: 83 CM/SEC
BH CV XLRA MEAS RIGHT CAROTID BULB EDV: 22 CM/SEC
BH CV XLRA MEAS RIGHT CAROTID BULB PSV: 67 CM/SEC
BH CV XLRA MEAS RIGHT DIST CCA EDV: 19 CM/SEC
BH CV XLRA MEAS RIGHT DIST CCA PSV: 70 CM/SEC
BH CV XLRA MEAS RIGHT DIST ICA EDV: 32 CM/SEC
BH CV XLRA MEAS RIGHT DIST ICA PSV: 93 CM/SEC
BH CV XLRA MEAS RIGHT MID ICA EDV: 27 CM/SEC
BH CV XLRA MEAS RIGHT MID ICA PSV: 81 CM/SEC
BH CV XLRA MEAS RIGHT PROX CCA EDV: 24 CM/SEC
BH CV XLRA MEAS RIGHT PROX CCA PSV: 78 CM/SEC
BH CV XLRA MEAS RIGHT PROX ECA EDV: 27 CM/SEC
BH CV XLRA MEAS RIGHT PROX ECA PSV: 122 CM/SEC
BH CV XLRA MEAS RIGHT PROX ICA EDV: 19 CM/SEC
BH CV XLRA MEAS RIGHT PROX ICA PSV: 68 CM/SEC
BH CV XLRA MEAS RIGHT VERTEBRAL A EDV: 16 CM/SEC
BH CV XLRA MEAS RIGHT VERTEBRAL A PSV: 49 CM/SEC
LEFT ARM BP: NORMAL MMHG
MAXIMAL PREDICTED HEART RATE: 158 BPM
RIGHT ARM BP: NORMAL MMHG
STRESS TARGET HR: 134 BPM

## 2022-04-21 PROCEDURE — 93880 EXTRACRANIAL BILAT STUDY: CPT

## 2022-04-21 PROCEDURE — 93880 EXTRACRANIAL BILAT STUDY: CPT | Performed by: SURGERY

## 2022-05-17 ENCOUNTER — OFFICE VISIT (OUTPATIENT)
Dept: BARIATRICS/WEIGHT MGMT | Facility: CLINIC | Age: 62
End: 2022-05-17

## 2022-05-17 VITALS
HEART RATE: 66 BPM | DIASTOLIC BLOOD PRESSURE: 64 MMHG | TEMPERATURE: 97.8 F | RESPIRATION RATE: 18 BRPM | HEIGHT: 63 IN | BODY MASS INDEX: 39.69 KG/M2 | WEIGHT: 224 LBS | SYSTOLIC BLOOD PRESSURE: 104 MMHG

## 2022-05-17 DIAGNOSIS — E66.9 OBESITY, CLASS II, BMI 35-39.9: Primary | ICD-10-CM

## 2022-05-17 DIAGNOSIS — Z46.51 FITTING AND ADJUSTMENT OF GASTRIC LAP BAND: ICD-10-CM

## 2022-05-17 DIAGNOSIS — E66.9 DIABETES MELLITUS TYPE 2 IN OBESE: ICD-10-CM

## 2022-05-17 DIAGNOSIS — I10 ESSENTIAL HYPERTENSION: ICD-10-CM

## 2022-05-17 DIAGNOSIS — E11.69 DIABETES MELLITUS TYPE 2 IN OBESE: ICD-10-CM

## 2022-05-17 DIAGNOSIS — E66.01 OBESITY, CLASS III, BMI 40-49.9 (MORBID OBESITY): ICD-10-CM

## 2022-05-17 DIAGNOSIS — R63.2 POLYPHAGIA: ICD-10-CM

## 2022-05-17 PROCEDURE — 99213 OFFICE O/P EST LOW 20 MIN: CPT | Performed by: NURSE PRACTITIONER

## 2022-05-17 RX ORDER — DESVENLAFAXINE 25 MG/1
TABLET, EXTENDED RELEASE ORAL
COMMUNITY
Start: 2022-03-28 | End: 2022-05-17

## 2022-05-17 RX ORDER — NYSTATIN AND TRIAMCINOLONE ACETONIDE 100000; 1 [USP'U]/G; MG/G
OINTMENT TOPICAL
COMMUNITY
Start: 2022-05-02

## 2022-05-17 RX ORDER — PHENTERMINE HYDROCHLORIDE 15 MG/1
15 CAPSULE ORAL EVERY MORNING
Qty: 90 CAPSULE | Refills: 0 | Status: SHIPPED | OUTPATIENT
Start: 2022-05-17 | End: 2022-08-17

## 2022-05-17 RX ORDER — FLUOXETINE HYDROCHLORIDE 20 MG/1
20 CAPSULE ORAL DAILY
COMMUNITY
Start: 2022-05-02 | End: 2022-05-17

## 2022-05-17 RX ORDER — TOPIRAMATE 50 MG/1
50 TABLET, FILM COATED ORAL NIGHTLY
Qty: 90 TABLET | Refills: 0 | Status: SHIPPED | OUTPATIENT
Start: 2022-05-17 | End: 2022-08-17

## 2022-05-17 NOTE — PROGRESS NOTES
[facility] BARIATRIC [facility] BARIATRIC SURGERY  [address] [facility] [address]  [facility] [id number] [phone]  [address] [facility] [address]  [facility] [id number] [phone]  Dept: [phone]  5/17/2022      Meera Ewing.  [id number]  [id number]  1960  female      Chief Complaint   Patient presents with   • Follow-up     BAND FOLLOW UP         Post-Op Bariatric Surgery:   Meera Ewing is status post laparoscopic gastric band (realize-c) placed in 2014 with 8.6ml of fluid.  She has been taking topiramate at 50mg nightly for the past 5 months and has lost 11lbs since starting. She also started 15mg phentermine this last month.     HPI:   Today's weight is 102 kg (224 lb) pounds, today's BMI is Body mass index is 39.69 kg/m².,@ has a  loss of 2 pounds since the last visit.. The patient reports a decreased portion size and loss of appetite.      Meera Ewing denies nausea, vomiting, reflux and reports that she is doing well     Diet and Exercise: Diet history reviewed and discussed with the patient. Weight loss/gains to date discussed with the patient. The patient states they are eating 30-60 grams of protein per day. She reports eating 2 meals per day, a typical portion size of 1/2 cup, eating 0-1 snacks per day, drinking 5-6 or more 8-oz. glasses of water per day, no carbonated beverage consumption and exercising regularly- farm work most days     Review of Systems   Constitutional: Positive for appetite change. Negative for fatigue and unexpected weight change.   HENT: Negative.    Eyes: Negative.    Respiratory: Negative.    Cardiovascular: Negative.  Negative for leg swelling.   Gastrointestinal: Negative for abdominal distention, abdominal pain, constipation, diarrhea, nausea and vomiting.   Genitourinary: Negative for difficulty urinating, frequency and urgency.   Musculoskeletal: Negative for back pain.   Skin: Negative.    Psychiatric/Behavioral: Negative.    All other  systems reviewed and are negative.      Patient Active Problem List   Diagnosis   • Obesity, Class II, BMI 35-39.9   • Fitting and adjustment of gastric lap band   • Polyphagia   • Essential hypertension   • Anxiety   • Cervicogenic headache   • Stroke risk   • Paresthesias       Past Medical History:   Diagnosis Date   • Diabetes (HCC)    • HTN (hypertension)        The following portions of the patient's history were reviewed and updated as appropriate: allergies, current medications, past family history, past medical history, past social history, past surgical history and problem list.    Vitals:    05/17/22 0941   BP: 104/64   Pulse: 66   Resp: 18   Temp: 97.8 °F (36.6 °C)       Physical Exam  Vitals and nursing note reviewed.   Constitutional:       Appearance: She is well-developed.   Neck:      Thyroid: No thyromegaly.   Cardiovascular:      Rate and Rhythm: Normal rate.   Pulmonary:      Effort: Pulmonary effort is normal. No respiratory distress.   Abdominal:      Palpations: Abdomen is soft.   Musculoskeletal:         General: No tenderness.   Skin:     General: Skin is warm and dry.   Neurological:      Mental Status: She is alert.   Psychiatric:         Behavior: Behavior normal.         Assessment:   Post-op, the patient is doing well..     Encounter Diagnoses   Name Primary?   • Obesity, Class II, BMI 35-39.9 Yes   • Essential hypertension    • Polyphagia    • Fitting and adjustment of gastric lap band        Plan:   Will continue medical weight loss therapy today.   Encouraged patient to be sure to get plenty of lean protein per day through small frequent meals all with a protein source.   Activity restrictions: none.   Recommended patient be sure to get at least 70 grams of protein per day by eating small, frequent meals all with high lean protein choices. Be sure to limit/cut back on daily carbohydrate intake. Discussed with the patient the recommended amount of water per day to intake- half of body  weight in ounces. Reviewed vitamin requirements. Be sure to do routine exercise, 150 minutes per week minimum, including both cardio and strength training.     Instructions / Recommendations: dietary counseling recommended, recommended a daily protein intake of  grams, vitamin supplement(s) recommended, recommended exercising at least 150 minutes per week, behavior modifications recommended and instructed to call the office for concerns, questions, or problems.     The patient was instructed to follow up in 3 months .      Total time spent during this encounter today was 25 minutes

## 2022-08-17 ENCOUNTER — OFFICE VISIT (OUTPATIENT)
Dept: BARIATRICS/WEIGHT MGMT | Facility: CLINIC | Age: 62
End: 2022-08-17

## 2022-08-17 VITALS
RESPIRATION RATE: 18 BRPM | HEART RATE: 74 BPM | DIASTOLIC BLOOD PRESSURE: 73 MMHG | HEIGHT: 63 IN | WEIGHT: 230 LBS | TEMPERATURE: 97.6 F | BODY MASS INDEX: 40.75 KG/M2 | SYSTOLIC BLOOD PRESSURE: 128 MMHG

## 2022-08-17 DIAGNOSIS — R63.2 POLYPHAGIA: ICD-10-CM

## 2022-08-17 DIAGNOSIS — E66.01 OBESITY, CLASS III, BMI 40-49.9 (MORBID OBESITY): Primary | ICD-10-CM

## 2022-08-17 DIAGNOSIS — F41.9 ANXIETY: ICD-10-CM

## 2022-08-17 DIAGNOSIS — I10 ESSENTIAL HYPERTENSION: ICD-10-CM

## 2022-08-17 PROBLEM — E66.9 OBESITY, CLASS II, BMI 35-39.9: Status: RESOLVED | Noted: 2017-11-28 | Resolved: 2022-08-17

## 2022-08-17 PROCEDURE — 99213 OFFICE O/P EST LOW 20 MIN: CPT | Performed by: NURSE PRACTITIONER

## 2022-08-17 RX ORDER — ZOLPIDEM TARTRATE 10 MG/1
10 TABLET ORAL AS NEEDED
COMMUNITY
Start: 2022-06-02 | End: 2022-11-30

## 2022-08-17 RX ORDER — TOPIRAMATE 25 MG/1
TABLET ORAL
Qty: 166 TABLET | Refills: 0 | Status: SHIPPED | OUTPATIENT
Start: 2022-08-17 | End: 2022-09-26

## 2022-08-17 NOTE — PROGRESS NOTES
[facility] BARIATRIC [facility] BARIATRIC SURGERY  [address] [facility] [address]  [facility] [address]  [phone]  [address] [facility] [address]  [facility] [id number] [phone]  Dept: [phone]  8/17/2022      Meera Ewing.  [id number]  [id number]  1960  female      Chief Complaint   Patient presents with   • Lap Band Adjustment     Band follow up (8.6mL)       BH Post-Op Bariatric Surgery:   Meera Ewing is status post Lapband procedure APL, performed in 2014 who has been taking phentermine with very nominal benefit but was previously taking topiramate which she did feel was helpful.   HPI:   Today's weight is 104 kg (230 lb)  pounds, today's BMI is Body mass index is 40.75 kg/m². and@ has a gain of 6 pounds since the last visit. The patient reports a portion size larger than the small plate, increased hunger and denies a loss of appetite.     Meera Ewing denies nausea, dysphagia, or abdominal pain. The patientdoes not have vomitng. The patientdoes not have reflux.    Diet and Exercise: Diet history reviewed and discussed with the patient. Weight loss/gains to date discussed with the patient. She reports eating 1-2 meals per day, a typical portion size of greater than 1 cup, eating 1/2 snack per day, drinking 5-6 8-oz. glasses of water per day. The patient can tolerate solid protein.   The patient is eating protein first. The patient is limiting food volume. The patient is taking vitamins. The patient is limiting snacking. The patient is exercising regularly. She is not drinking carbonated beverages.     The following portions of the patient's history were reviewed and updated as appropriate: allergies, current medications, past family history, past medical history, past social history, past surgical history and problem list.    Vitals:    08/17/22 1129   BP: 128/73   Pulse: 74   Resp: 18   Temp: 97.6 °F (36.4 °C)       Review of Systems   Constitutional: Positive for appetite  change. Negative for fatigue and unexpected weight change.   HENT: Negative.    Eyes: Negative.    Respiratory: Negative.    Cardiovascular: Negative.  Negative for leg swelling.   Gastrointestinal: Negative for abdominal distention, abdominal pain, constipation, diarrhea, nausea and vomiting.   Genitourinary: Negative for difficulty urinating, frequency and urgency.   Musculoskeletal: Negative for back pain.   Skin: Negative.    Psychiatric/Behavioral: Negative.    All other systems reviewed and are negative.      Physical Exam  Vitals and nursing note reviewed.   Constitutional:       Appearance: She is well-developed.   Neck:      Thyroid: No thyromegaly.   Cardiovascular:      Rate and Rhythm: Normal rate.   Pulmonary:      Effort: Pulmonary effort is normal. No respiratory distress.   Abdominal:      Palpations: Abdomen is soft.   Musculoskeletal:         General: No tenderness.   Skin:     General: Skin is warm and dry.   Neurological:      Mental Status: She is alert.   Psychiatric:         Behavior: Behavior normal.         Assessment: Post-operatively the patient is doing well    Encounter Diagnoses   Name Primary?   • Obesity, Class III, BMI 40-49.9 (morbid obesity) ([facility]) Yes   • Essential hypertension    • Anxiety    • Polyphagia        Plan:    Will restart topirimate today. Patient has the ideal level of restriction in her band at this time. Her last CMP while on the medication was WNL.     We discussed her protein sources and that eating dense solid protein along with plenty of vegetables and fresh fruits is important for weight loss. Reviewed appropriate water intake- half of body weight in ounces and exercise routine- minimum of 150 minutes per with including both cardio and strength training. Instructed not to drink with meals and wait 45 minutes after each meal before drinking.    She should follow-up in 3 months     Activity restrictions: None.   Instructions / Recommendations: dietary counseling  recommended, recommended a daily protein intake of  grams, patient was advised that the lap band system works best when consuming solid foods, vitamin supplement(s) recommended, recommended exercising at least 150 minutes per week, behavior modifications recommended and instructed to call the office for concerns, questions, or problems.    Total time spent with the patient was 25 minutes.  Chart was also reviewed prior to starting the visit. The patient was counseled regarding the LAP-BAND and how the band works.

## 2022-09-26 RX ORDER — TOPIRAMATE 50 MG/1
50 TABLET, FILM COATED ORAL NIGHTLY
Qty: 30 TABLET | Refills: 11 | Status: SHIPPED | OUTPATIENT
Start: 2022-09-26 | End: 2022-11-15 | Stop reason: SDUPTHER

## 2022-11-15 ENCOUNTER — OFFICE VISIT (OUTPATIENT)
Dept: BARIATRICS/WEIGHT MGMT | Facility: CLINIC | Age: 62
End: 2022-11-15

## 2022-11-15 VITALS
TEMPERATURE: 98.2 F | DIASTOLIC BLOOD PRESSURE: 61 MMHG | WEIGHT: 204.8 LBS | HEIGHT: 63 IN | BODY MASS INDEX: 36.29 KG/M2 | HEART RATE: 62 BPM | SYSTOLIC BLOOD PRESSURE: 105 MMHG

## 2022-11-15 DIAGNOSIS — R73.03 PREDIABETES: ICD-10-CM

## 2022-11-15 DIAGNOSIS — I10 ESSENTIAL HYPERTENSION: ICD-10-CM

## 2022-11-15 DIAGNOSIS — R63.2 POLYPHAGIA: ICD-10-CM

## 2022-11-15 DIAGNOSIS — E66.9 OBESITY, CLASS II, BMI 35-39.9: Primary | ICD-10-CM

## 2022-11-15 PROBLEM — E66.01 OBESITY, CLASS III, BMI 40-49.9 (MORBID OBESITY) (HCC): Status: RESOLVED | Noted: 2021-12-07 | Resolved: 2022-11-15

## 2022-11-15 PROCEDURE — 99213 OFFICE O/P EST LOW 20 MIN: CPT | Performed by: NURSE PRACTITIONER

## 2022-11-15 RX ORDER — DULAGLUTIDE 3 MG/.5ML
3 INJECTION, SOLUTION SUBCUTANEOUS WEEKLY
COMMUNITY
Start: 2022-11-09

## 2022-11-15 RX ORDER — TOPIRAMATE 50 MG/1
50 TABLET, FILM COATED ORAL NIGHTLY
Qty: 90 TABLET | Refills: 2 | Status: SHIPPED | OUTPATIENT
Start: 2022-11-15

## 2022-11-15 RX ORDER — LOSARTAN POTASSIUM 100 MG/1
1 TABLET ORAL DAILY
COMMUNITY
Start: 2022-10-25

## 2022-11-15 NOTE — PROGRESS NOTES
[facility] BAR[facility]ATR[facility] BARIATRIC SURGERY  [address] [facility] [address]  [facility] [id number] [phone]  [address] [facility] [address]  [facility] [address] [phone]  Dept: [phone]  11/15/2022      Meera Ewing.  [id number]  [id number]  1960  female      Chief Complaint   Patient presents with   • Follow-up     3 months       BH Post-Op Bariatric Surgery:   Meera Ewing is status post Lapband procedure realize-c (8.6ml), performed on 2014 who has been back on toprimate for cravings to help with satiety for the past 3 months she also followed up with her primary care provider and had her HbA1c checked and was diagnosed since started metformin and Trulicity and is worked up to 3 mg.   HPI:   Today's weight is 92.9 kg (204 lb 12.8 oz) pounds, today's BMI is Body mass index is 36.29 kg/m². and@ has a loss of 26 pounds since the last visit. The patient reports experiencing hunger, but decreased hunger and loss of appetite.     Meera Ewing denies abdominal pain. The patientdoes not have vomiting or regurgitation. The patientdoes not have heartburn/reflux.    Patient states their portion size is the small plate and their hunger is appropriate.    Diet and Exercise: Diet history reviewed and discussed with the patient. Weight loss/gains to date discussed with the patient. She reports eating 3 meals per day, a typical portion size of 1/2-1 cup, eating 0-1 snack per day, drinking 5-6 8-oz. glasses of water per day. The patient can tolerate solid protein.   The patient is eating protein first. The patient is limiting food volume. The patient is taking vitamins. The patient is limiting snacking. The patient is regular exercise. She is not drinking carbonated beverages.     The following portions of the patient's history were reviewed and updated as appropriate: allergies, current medications, past family history, past medical history, past social history, past surgical history  and problem list.    Review of Systems   Constitutional: Positive for appetite change. Negative for fatigue and unexpected weight change.   HENT: Negative.    Eyes: Negative.    Respiratory: Negative.    Cardiovascular: Negative.  Negative for leg swelling.   Gastrointestinal: Negative for abdominal distention, abdominal pain, constipation, diarrhea, nausea and vomiting.   Genitourinary: Negative for difficulty urinating, frequency and urgency.   Musculoskeletal: Negative for back pain.   Skin: Negative.    Psychiatric/Behavioral: Negative.    All other systems reviewed and are negative.      Vitals:    11/15/22 0756   BP: 105/61   Pulse: 62   Temp: 98.2 °F (36.8 °C)       Physical Exam  Vitals and nursing note reviewed.   Constitutional:       Appearance: She is well-developed. She is not diaphoretic.   Pulmonary:      Effort: Pulmonary effort is normal.   Neurological:      Mental Status: She is alert and oriented to person, place, and time.   Psychiatric:         Behavior: Behavior normal.         Assessment: Post-operatively the patient is doing well.     Encounter Diagnoses   Name Primary?   • Obesity, Class II, BMI 35-39.9 Yes   • Prediabetes    • Essential hypertension    • Polyphagia        Plan:  Continue topiramate.   No adjustment today as patient has ideal level of restriction. RTC for n/v/d/regurg. Encouraged patient to be sure to eat plenty of dense lean protein and high fiber foods like vegetables and fresh fruits while reducing simple carbohydrates. Reviewed the importance of eating solid foods vs. soft which will contribute to weight gain. Discussed the recommended amount of water to intake daily- half of body weight in ounces. Not drinking with or right after meals.    Activity restrictions: None.   Instructions / Recommendations: dietary counseling recommended, recommended a daily protein intake of  grams, patient was advised that the lap band system works best when consuming solid foods,  vitamin supplement(s) recommended, recommended exercising at least 150 minutes per week inclduing both cardio and strength training, behavior modifications recommended and instructed to call the office for concerns, questions, or problems.     The patient was instructed to follow up in 6 months      The patient was counseled regarding the LAP-BAND and how the band works.  Total time spent face to face was  25 minutes.  The chart was reviewed prior to starting the visit.

## 2023-05-16 ENCOUNTER — OFFICE VISIT (OUTPATIENT)
Dept: BARIATRICS/WEIGHT MGMT | Facility: CLINIC | Age: 63
End: 2023-05-16
Payer: COMMERCIAL

## 2023-05-16 VITALS
WEIGHT: 193 LBS | HEIGHT: 63 IN | SYSTOLIC BLOOD PRESSURE: 113 MMHG | TEMPERATURE: 97.5 F | DIASTOLIC BLOOD PRESSURE: 67 MMHG | BODY MASS INDEX: 34.2 KG/M2 | HEART RATE: 63 BPM

## 2023-05-16 DIAGNOSIS — I10 ESSENTIAL HYPERTENSION: ICD-10-CM

## 2023-05-16 DIAGNOSIS — R73.03 PREDIABETES: ICD-10-CM

## 2023-05-16 DIAGNOSIS — F41.9 ANXIETY: ICD-10-CM

## 2023-05-16 DIAGNOSIS — E66.9 OBESITY, CLASS I, BMI 30-34.9: Primary | ICD-10-CM

## 2023-05-16 RX ORDER — TOPIRAMATE 50 MG/1
50 TABLET, FILM COATED ORAL NIGHTLY
Qty: 90 TABLET | Refills: 2 | Status: SHIPPED | OUTPATIENT
Start: 2023-05-16

## 2023-05-16 NOTE — PROGRESS NOTES
[facility] BARIATRIC [facility] BARIATRIC SURGERY  [address] [facility] [address]  [facility] [id number] [phone]  [address] [facility] [address]  [facility] [address] [phone]  Dept: [phone]  5/16/2023      Meera Ewing.  [id number]  [id number]  1960  female      Chief Complaint   Patient presents with   • Follow-up     Fup band        BH Post-Op Bariatric Surgery:   Meera Ewing is status post Lapband procedure realize-c, performed on 2014 with 8.6ml/11mls who has been taking topiramate for cravings and is doing well but reports that her weight loss has stalled.     HPI:   Today's weight is 87.5 kg (193 lb) pounds, today's BMI is Body mass index is 34.2 kg/m². and@ has a loss of 11 pounds since the last visit. The patient reports experiencing hunger, but decreased hunger and loss of appetite.     Meera Ewing denies abdominal pain. The patientdoes not have vomiting or regurgitation. The patientdoes not have heartburn/reflux.      Diet and Exercise: Diet history reviewed and discussed with the patient. Weight loss/gains to date discussed with the patient. She reports eating 3 meals per day, a typical portion size of 1 cup, eating 2 snack per day, drinking 3-5 8-oz. glasses of water per day. The patient can tolerate solid protein.   The patient is eating protein first. The patient is limiting food volume. The patient is taking vitamins. The patient is limiting snacking. The patient is regular exercise. She is not drinking carbonated beverages.     The following portions of the patient's history were reviewed and updated as appropriate: allergies, current medications, past family history, past medical history, past social history, past surgical history and problem list.    Review of Systems   Constitutional: Positive for appetite change. Negative for fatigue.   HENT: Negative.    Respiratory: Negative.    Cardiovascular: Negative.    Gastrointestinal: Negative.    Neurological:  Negative.    Psychiatric/Behavioral: Negative.        Vitals:    05/16/23 0753   BP: 113/67   Pulse: 63   Temp: 97.5 °F (36.4 °C)       Physical Exam  Vitals and nursing note reviewed.   Constitutional:       Appearance: She is well-developed. She is not diaphoretic.   Pulmonary:      Effort: Pulmonary effort is normal.   Neurological:      Mental Status: She is alert and oriented to person, place, and time.   Psychiatric:         Behavior: Behavior normal.         Assessment: Post-operatively the patient is doing well. Would like to consider adding phentermine.     Encounter Diagnoses   Name Primary?   • Obesity, Class I, BMI 30-34.9 Yes   • Essential hypertension    • Prediabetes    • Anxiety        Plan:  Will continue topiramate. Will update EKG and if stable will plan on restarting phentermine.     No adjustment today. RTC for n/v/d/regurg. Encouraged patient to be sure to eat plenty of dense lean protein and high fiber foods like vegetables and fresh fruits while reducing simple carbohydrates. Reviewed the importance of eating solid foods vs. soft which will contribute to weight gain. Discussed the recommended amount of water to intake daily- half of body weight in ounces. Not drinking with or right after meals.    Activity restrictions: None.   Instructions / Recommendations: dietary counseling recommended, recommended a daily protein intake of  grams, patient was advised that the lap band system works best when consuming solid foods, vitamin supplement(s) recommended, recommended exercising at least 150 minutes per week inclduing both cardio and strength training, behavior modifications recommended and instructed to call the office for concerns, questions, or problems.     The patient was instructed to follow up in 3 months      The patient was counseled regarding the LAP-BAND and how the band works.  Total time spent face to face was  25 minutes.  The chart was reviewed prior to starting the  visit.

## 2023-05-24 DIAGNOSIS — E66.9 OBESITY, CLASS I, BMI 30-34.9: Primary | ICD-10-CM

## 2023-05-24 RX ORDER — PHENTERMINE HYDROCHLORIDE 37.5 MG/1
37.5 CAPSULE ORAL EVERY MORNING
Qty: 30 CAPSULE | Refills: 2 | Status: SHIPPED | OUTPATIENT
Start: 2023-05-24

## 2024-02-09 ENCOUNTER — OFFICE VISIT (OUTPATIENT)
Dept: BARIATRICS/WEIGHT MGMT | Facility: CLINIC | Age: 64
End: 2024-02-09
Payer: COMMERCIAL

## 2024-02-09 VITALS
DIASTOLIC BLOOD PRESSURE: 77 MMHG | BODY MASS INDEX: 33.13 KG/M2 | HEART RATE: 66 BPM | WEIGHT: 187 LBS | HEIGHT: 63 IN | SYSTOLIC BLOOD PRESSURE: 121 MMHG | TEMPERATURE: 97.3 F

## 2024-02-09 DIAGNOSIS — F41.9 ANXIETY: ICD-10-CM

## 2024-02-09 DIAGNOSIS — R63.2 POLYPHAGIA: ICD-10-CM

## 2024-02-09 DIAGNOSIS — Z46.51 FITTING AND ADJUSTMENT OF GASTRIC LAP BAND: ICD-10-CM

## 2024-02-09 DIAGNOSIS — R73.03 PREDIABETES: ICD-10-CM

## 2024-02-09 DIAGNOSIS — I10 ESSENTIAL HYPERTENSION: ICD-10-CM

## 2024-02-09 DIAGNOSIS — E66.9 OBESITY, CLASS I, BMI 30-34.9: Primary | ICD-10-CM

## 2024-02-09 PROCEDURE — 99213 OFFICE O/P EST LOW 20 MIN: CPT | Performed by: NURSE PRACTITIONER

## 2024-02-09 PROCEDURE — 3074F SYST BP LT 130 MM HG: CPT | Performed by: NURSE PRACTITIONER

## 2024-02-09 PROCEDURE — 3078F DIAST BP <80 MM HG: CPT | Performed by: NURSE PRACTITIONER

## 2024-02-09 RX ORDER — TOPIRAMATE 25 MG/1
TABLET ORAL
Qty: 166 TABLET | Refills: 0 | Status: SHIPPED | OUTPATIENT
Start: 2024-02-09 | End: 2024-05-08

## 2024-02-09 RX ORDER — QUETIAPINE FUMARATE 50 MG/1
50 TABLET, FILM COATED ORAL NIGHTLY
COMMUNITY
Start: 2023-05-16 | End: 2024-02-09

## 2024-02-09 RX ORDER — PHENTERMINE HYDROCHLORIDE 37.5 MG/1
37.5 CAPSULE ORAL EVERY MORNING
Qty: 90 CAPSULE | Refills: 0 | Status: SHIPPED | OUTPATIENT
Start: 2024-02-09

## 2024-02-09 NOTE — PROGRESS NOTES
[facility] BARIATRIC [facility] BARIATRIC SURGERY  [address]  [facility] [address]  [phone]  [address]  [facility] [address] [phone]  Dept: [phone]  2/9/2024      Meera Ewing.  [id number]  [id number]  1960  female      Chief Complaint   Patient presents with    Follow-up     Fup band, discuss phentermine       BH Post-Op Bariatric Surgery:   Meera Ewing is status post Lapband procedure APL, performed in 2014 with 8.2mls    HPI:   Today's weight is 84.8 kg (187 lb) pounds, today's BMI is Body mass index is 33.13 kg/m². andHE@ has a loss of 6 pounds since the last visit. The patient reports experiencing hunger, but decreased hunger and loss of appetite.     Meera Ewing denies abdominal pain. The patientdoes not have vomiting or regurgitation. The patientdoes not have heartburn/reflux.    She reports previously taking trulicity and was seeing weight loss and an improved hba1c but has temporarily lost insurance coverage and cannot afford it currently. She has had success with phentermine and topirmate in the past and would like to consider restarting these medications.     Diet and Exercise: Diet history reviewed and discussed with the patient. Weight loss/gains to date discussed with the patient. She reports eating 3 meals per day, a typical portion size of 1 cup, eating 1 snack per day, drinking 5-6 8-oz. glasses of water per day. The patient can tolerate solid protein.   The patient is eating protein first. The patient is limiting food volume. The patient is taking vitamins. The patient is limiting snacking. The patient is not regular exercise. She is drinking carbonated beverages.     The following portions of the patient's history were reviewed and updated as appropriate: allergies, current medications, past family history, past medical history, past social history, past surgical history, and problem list.    Review of Systems    Constitutional:  Positive for appetite change. Negative for fatigue and unexpected weight change.   HENT: Negative.     Eyes: Negative.    Respiratory: Negative.     Cardiovascular: Negative.  Negative for leg swelling.   Gastrointestinal:  Negative for abdominal distention, abdominal pain, constipation, diarrhea, nausea and vomiting.   Genitourinary:  Negative for difficulty urinating, frequency and urgency.   Musculoskeletal:  Negative for back pain.   Skin: Negative.    Psychiatric/Behavioral: Negative.     All other systems reviewed and are negative.      Vitals:    02/09/24 0828   BP: 121/77   Pulse: 66   Temp: 97.3 °F (36.3 °C)       Physical Exam  Vitals and nursing note reviewed.   Constitutional:       Appearance: She is well-developed.   Neck:      Thyroid: No thyromegaly.   Cardiovascular:      Rate and Rhythm: Normal rate.   Pulmonary:      Effort: Pulmonary effort is normal. No respiratory distress.   Abdominal:      Palpations: Abdomen is soft.   Musculoskeletal:         General: No tenderness.   Skin:     General: Skin is warm and dry.   Neurological:      Mental Status: She is alert.   Psychiatric:         Behavior: Behavior normal.         Assessment: Post-operatively the patient is doing well.     Encounter Diagnoses   Name Primary?    Obesity, Class I, BMI 30-34.9 Yes    Essential hypertension     Prediabetes     Anxiety     Fitting and adjustment of gastric lap band     Polyphagia        Plan:  Restart phentermine daily. Will restart topirmate at 25mg for two weeks with a plan to taper up to 50mg after two weeks. Reviewed dosing, common AE, avoiding stimulants with phentermine, reviewed history. Blood pressure is well controlled, last EKG was reviewed. Encouraged to increase fluid intake. She does not have a history of kidney stones of seizures.     No adjustment today. RTC for n/v/d/regurg. Encouraged patient to be sure to eat plenty of dense lean protein and high fiber foods like vegetables  and fresh fruits while reducing simple carbohydrates. Reviewed the importance of eating solid foods vs. soft which will contribute to weight gain. Discussed the recommended amount of water to intake daily- half of body weight in ounces. Not drinking with or right after meals.    Activity restrictions: None.   Instructions / Recommendations: dietary counseling recommended, recommended a daily protein intake of  grams, patient was advised that the lap band system works best when consuming solid foods, vitamin supplement(s) recommended, recommended exercising at least 150 minutes per week inclduing both cardio and strength training, behavior modifications recommended and instructed to call the office for concerns, questions, or problems.     The patient was instructed to follow up in 3 months      The patient was counseled regarding the LAP-BAND and how the band works.  Total time spent face to face was  25 minutes.  The chart was reviewed prior to starting the visit.